# Patient Record
Sex: MALE | Race: WHITE | Employment: FULL TIME | URBAN - METROPOLITAN AREA
[De-identification: names, ages, dates, MRNs, and addresses within clinical notes are randomized per-mention and may not be internally consistent; named-entity substitution may affect disease eponyms.]

---

## 2019-01-03 ENCOUNTER — HOSPITAL ENCOUNTER (EMERGENCY)
Age: 34
Discharge: PSYCHIATRIC OR SUBSTANCE USE FACILITY | End: 2019-01-03
Attending: EMERGENCY MEDICINE
Payer: OTHER GOVERNMENT

## 2019-01-03 VITALS
HEART RATE: 71 BPM | SYSTOLIC BLOOD PRESSURE: 122 MMHG | TEMPERATURE: 98.4 F | DIASTOLIC BLOOD PRESSURE: 78 MMHG | RESPIRATION RATE: 18 BRPM | OXYGEN SATURATION: 98 %

## 2019-01-03 DIAGNOSIS — R45.851 SUICIDAL IDEATION: Primary | ICD-10-CM

## 2019-01-03 LAB
ACETAMINOPHEN LEVEL: <5 MCG/ML (ref 10–30)
ALBUMIN SERPL-MCNC: 5 G/DL (ref 3.5–5.2)
ALP BLD-CCNC: 93 U/L (ref 40–129)
ALT SERPL-CCNC: 42 U/L (ref 0–40)
AMPHETAMINE SCREEN, URINE: NOT DETECTED
ANION GAP SERPL CALCULATED.3IONS-SCNC: 13 MMOL/L (ref 7–16)
AST SERPL-CCNC: 24 U/L (ref 0–39)
BARBITURATE SCREEN URINE: NOT DETECTED
BASOPHILS ABSOLUTE: 0.03 E9/L (ref 0–0.2)
BASOPHILS RELATIVE PERCENT: 0.6 % (ref 0–2)
BENZODIAZEPINE SCREEN, URINE: NOT DETECTED
BILIRUB SERPL-MCNC: 0.5 MG/DL (ref 0–1.2)
BUN BLDV-MCNC: 12 MG/DL (ref 6–20)
CALCIUM SERPL-MCNC: 9.6 MG/DL (ref 8.6–10.2)
CANNABINOID SCREEN URINE: NOT DETECTED
CHLORIDE BLD-SCNC: 99 MMOL/L (ref 98–107)
CO2: 30 MMOL/L (ref 22–29)
COCAINE METABOLITE SCREEN URINE: NOT DETECTED
CREAT SERPL-MCNC: 0.9 MG/DL (ref 0.7–1.2)
EKG ATRIAL RATE: 63 BPM
EKG P AXIS: 22 DEGREES
EKG P-R INTERVAL: 124 MS
EKG Q-T INTERVAL: 418 MS
EKG QRS DURATION: 96 MS
EKG QTC CALCULATION (BAZETT): 427 MS
EKG R AXIS: -2 DEGREES
EKG T AXIS: 42 DEGREES
EKG VENTRICULAR RATE: 63 BPM
EOSINOPHILS ABSOLUTE: 0.05 E9/L (ref 0.05–0.5)
EOSINOPHILS RELATIVE PERCENT: 1.1 % (ref 0–6)
ETHANOL: <10 MG/DL (ref 0–0.08)
GFR AFRICAN AMERICAN: >60
GFR NON-AFRICAN AMERICAN: >60 ML/MIN/1.73
GLUCOSE BLD-MCNC: 105 MG/DL (ref 74–99)
HCT VFR BLD CALC: 45.8 % (ref 37–54)
HEMOGLOBIN: 15.2 G/DL (ref 12.5–16.5)
IMMATURE GRANULOCYTES #: 0.01 E9/L
IMMATURE GRANULOCYTES %: 0.2 % (ref 0–5)
LYMPHOCYTES ABSOLUTE: 1.52 E9/L (ref 1.5–4)
LYMPHOCYTES RELATIVE PERCENT: 32.3 % (ref 20–42)
MCH RBC QN AUTO: 29.2 PG (ref 26–35)
MCHC RBC AUTO-ENTMCNC: 33.2 % (ref 32–34.5)
MCV RBC AUTO: 88.1 FL (ref 80–99.9)
METHADONE SCREEN, URINE: NOT DETECTED
MONOCYTES ABSOLUTE: 0.38 E9/L (ref 0.1–0.95)
MONOCYTES RELATIVE PERCENT: 8.1 % (ref 2–12)
NEUTROPHILS ABSOLUTE: 2.72 E9/L (ref 1.8–7.3)
NEUTROPHILS RELATIVE PERCENT: 57.7 % (ref 43–80)
OPIATE SCREEN URINE: NOT DETECTED
PDW BLD-RTO: 12.2 FL (ref 11.5–15)
PHENCYCLIDINE SCREEN URINE: NOT DETECTED
PLATELET # BLD: 209 E9/L (ref 130–450)
PMV BLD AUTO: 9.1 FL (ref 7–12)
POTASSIUM SERPL-SCNC: 3.7 MMOL/L (ref 3.5–5)
PROPOXYPHENE SCREEN: NOT DETECTED
RBC # BLD: 5.2 E12/L (ref 3.8–5.8)
SALICYLATE, SERUM: <0.3 MG/DL (ref 0–30)
SODIUM BLD-SCNC: 142 MMOL/L (ref 132–146)
TOTAL PROTEIN: 7.7 G/DL (ref 6.4–8.3)
TRICYCLIC ANTIDEPRESSANTS SCREEN SERUM: NEGATIVE NG/ML
WBC # BLD: 4.7 E9/L (ref 4.5–11.5)

## 2019-01-03 PROCEDURE — 93005 ELECTROCARDIOGRAM TRACING: CPT | Performed by: PHYSICIAN ASSISTANT

## 2019-01-03 PROCEDURE — 80053 COMPREHEN METABOLIC PANEL: CPT

## 2019-01-03 PROCEDURE — 85025 COMPLETE CBC W/AUTO DIFF WBC: CPT

## 2019-01-03 PROCEDURE — 36415 COLL VENOUS BLD VENIPUNCTURE: CPT

## 2019-01-03 PROCEDURE — 99285 EMERGENCY DEPT VISIT HI MDM: CPT

## 2019-01-03 PROCEDURE — G0480 DRUG TEST DEF 1-7 CLASSES: HCPCS

## 2019-01-03 PROCEDURE — 80307 DRUG TEST PRSMV CHEM ANLYZR: CPT

## 2019-01-04 PROCEDURE — 93010 ELECTROCARDIOGRAM REPORT: CPT | Performed by: INTERNAL MEDICINE

## 2019-06-21 ENCOUNTER — APPOINTMENT (OUTPATIENT)
Dept: GENERAL RADIOLOGY | Age: 34
End: 2019-06-21
Payer: OTHER GOVERNMENT

## 2019-06-21 ENCOUNTER — APPOINTMENT (OUTPATIENT)
Dept: CT IMAGING | Age: 34
End: 2019-06-21
Payer: OTHER GOVERNMENT

## 2019-06-21 ENCOUNTER — HOSPITAL ENCOUNTER (OUTPATIENT)
Age: 34
Setting detail: OBSERVATION
Discharge: HOME | End: 2019-06-22
Attending: EMERGENCY MEDICINE | Admitting: HOSPITALIST
Payer: OTHER GOVERNMENT

## 2019-06-21 DIAGNOSIS — S22.009A CLOSED FRACTURE OF THORACIC VERTEBRA, UNSPECIFIED FRACTURE MORPHOLOGY, UNSPECIFIED THORACIC VERTEBRAL LEVEL, INITIAL ENCOUNTER (HCC): ICD-10-CM

## 2019-06-21 DIAGNOSIS — G40.919 BREAKTHROUGH SEIZURE (HCC): Primary | ICD-10-CM

## 2019-06-21 PROBLEM — R56.9 SEIZURES (HCC): Status: ACTIVE | Noted: 2019-06-21

## 2019-06-21 PROBLEM — S06.9XAA TBI (TRAUMATIC BRAIN INJURY): Status: ACTIVE | Noted: 2019-06-21

## 2019-06-21 PROBLEM — S22.000A CLOSED COMPRESSION FRACTURE OF THORACIC VERTEBRA (HCC): Status: ACTIVE | Noted: 2019-06-21

## 2019-06-21 PROBLEM — Z91.14 NONCOMPLIANCE WITH MEDICATIONS: Status: ACTIVE | Noted: 2019-06-21

## 2019-06-21 LAB
ALBUMIN SERPL-MCNC: 4.7 G/DL (ref 3.5–5.2)
ALP BLD-CCNC: 66 U/L (ref 40–129)
ALT SERPL-CCNC: 36 U/L (ref 0–40)
ANION GAP SERPL CALCULATED.3IONS-SCNC: 14 MMOL/L (ref 7–16)
AST SERPL-CCNC: 36 U/L (ref 0–39)
BILIRUB SERPL-MCNC: 0.6 MG/DL (ref 0–1.2)
BUN BLDV-MCNC: 8 MG/DL (ref 6–20)
CALCIUM SERPL-MCNC: 9.3 MG/DL (ref 8.6–10.2)
CHLORIDE BLD-SCNC: 103 MMOL/L (ref 98–107)
CO2: 24 MMOL/L (ref 22–29)
CREAT SERPL-MCNC: 0.8 MG/DL (ref 0.7–1.2)
GFR AFRICAN AMERICAN: >60
GFR AFRICAN AMERICAN: >60
GFR NON-AFRICAN AMERICAN: >60 ML/MIN/1.73
GFR NON-AFRICAN AMERICAN: >60 ML/MIN/1.73
GLUCOSE BLD-MCNC: 101 MG/DL (ref 74–99)
GLUCOSE BLD-MCNC: 91 MG/DL (ref 74–99)
HCT VFR BLD CALC: 43.5 % (ref 37–54)
HEMOGLOBIN: 15.1 G/DL (ref 12.5–16.5)
MAGNESIUM: 2.2 MG/DL (ref 1.6–2.6)
MCH RBC QN AUTO: 30.6 PG (ref 26–35)
MCHC RBC AUTO-ENTMCNC: 34.7 % (ref 32–34.5)
MCV RBC AUTO: 88.2 FL (ref 80–99.9)
PDW BLD-RTO: 12 FL (ref 11.5–15)
PERFORMED ON: ABNORMAL
PHOSPHORUS: 3.4 MG/DL (ref 2.5–4.5)
PLATELET # BLD: 228 E9/L (ref 130–450)
PMV BLD AUTO: 9.6 FL (ref 7–12)
POC CHLORIDE: 105 MMOL/L (ref 100–108)
POC CREATININE: 0.8 MG/DL (ref 0.7–1.2)
POC POTASSIUM: 3.9 MMOL/L (ref 3.5–5)
POC SODIUM: 141 MMOL/L (ref 132–146)
POTASSIUM SERPL-SCNC: 4.1 MMOL/L (ref 3.5–5)
RBC # BLD: 4.93 E12/L (ref 3.8–5.8)
SODIUM BLD-SCNC: 141 MMOL/L (ref 132–146)
TOTAL PROTEIN: 7.1 G/DL (ref 6.4–8.3)
VALPROIC ACID LEVEL: <3 MCG/ML (ref 50–100)
WBC # BLD: 11.7 E9/L (ref 4.5–11.5)

## 2019-06-21 PROCEDURE — 93005 ELECTROCARDIOGRAM TRACING: CPT | Performed by: EMERGENCY MEDICINE

## 2019-06-21 PROCEDURE — 36415 COLL VENOUS BLD VENIPUNCTURE: CPT

## 2019-06-21 PROCEDURE — 6370000000 HC RX 637 (ALT 250 FOR IP): Performed by: CLINICAL NURSE SPECIALIST

## 2019-06-21 PROCEDURE — 99219 PR INITIAL OBSERVATION CARE/DAY 50 MINUTES: CPT | Performed by: NEUROLOGICAL SURGERY

## 2019-06-21 PROCEDURE — 72128 CT CHEST SPINE W/O DYE: CPT

## 2019-06-21 PROCEDURE — 6370000000 HC RX 637 (ALT 250 FOR IP): Performed by: PHYSICIAN ASSISTANT

## 2019-06-21 PROCEDURE — 85027 COMPLETE CBC AUTOMATED: CPT

## 2019-06-21 PROCEDURE — 96374 THER/PROPH/DIAG INJ IV PUSH: CPT

## 2019-06-21 PROCEDURE — G0378 HOSPITAL OBSERVATION PER HR: HCPCS

## 2019-06-21 PROCEDURE — 72072 X-RAY EXAM THORAC SPINE 3VWS: CPT

## 2019-06-21 PROCEDURE — 72131 CT LUMBAR SPINE W/O DYE: CPT

## 2019-06-21 PROCEDURE — 82565 ASSAY OF CREATININE: CPT

## 2019-06-21 PROCEDURE — 82435 ASSAY OF BLOOD CHLORIDE: CPT

## 2019-06-21 PROCEDURE — 80053 COMPREHEN METABOLIC PANEL: CPT

## 2019-06-21 PROCEDURE — 72100 X-RAY EXAM L-S SPINE 2/3 VWS: CPT

## 2019-06-21 PROCEDURE — 6360000002 HC RX W HCPCS: Performed by: PHYSICIAN ASSISTANT

## 2019-06-21 PROCEDURE — 2580000003 HC RX 258: Performed by: CLINICAL NURSE SPECIALIST

## 2019-06-21 PROCEDURE — 83735 ASSAY OF MAGNESIUM: CPT

## 2019-06-21 PROCEDURE — 84100 ASSAY OF PHOSPHORUS: CPT

## 2019-06-21 PROCEDURE — 84132 ASSAY OF SERUM POTASSIUM: CPT

## 2019-06-21 PROCEDURE — 99284 EMERGENCY DEPT VISIT MOD MDM: CPT

## 2019-06-21 PROCEDURE — 80164 ASSAY DIPROPYLACETIC ACD TOT: CPT

## 2019-06-21 PROCEDURE — 84295 ASSAY OF SERUM SODIUM: CPT

## 2019-06-21 PROCEDURE — 82947 ASSAY GLUCOSE BLOOD QUANT: CPT

## 2019-06-21 RX ORDER — SODIUM CHLORIDE 0.9 % (FLUSH) 0.9 %
10 SYRINGE (ML) INJECTION EVERY 12 HOURS SCHEDULED
Status: DISCONTINUED | OUTPATIENT
Start: 2019-06-21 | End: 2019-06-22 | Stop reason: HOSPADM

## 2019-06-21 RX ORDER — M-VIT,TX,IRON,MINS/CALC/FOLIC 27MG-0.4MG
1 TABLET ORAL DAILY
COMMUNITY

## 2019-06-21 RX ORDER — LAMOTRIGINE 150 MG/1
150 TABLET ORAL 2 TIMES DAILY
COMMUNITY

## 2019-06-21 RX ORDER — HYDROCODONE BITARTRATE AND ACETAMINOPHEN 5; 325 MG/1; MG/1
1 TABLET ORAL ONCE
Status: COMPLETED | OUTPATIENT
Start: 2019-06-21 | End: 2019-06-21

## 2019-06-21 RX ORDER — ACETAMINOPHEN 500 MG
1000 TABLET ORAL ONCE
Status: COMPLETED | OUTPATIENT
Start: 2019-06-21 | End: 2019-06-21

## 2019-06-21 RX ORDER — HYDROCODONE BITARTRATE AND ACETAMINOPHEN 5; 325 MG/1; MG/1
1 TABLET ORAL EVERY 4 HOURS PRN
Status: DISCONTINUED | OUTPATIENT
Start: 2019-06-21 | End: 2019-06-22 | Stop reason: HOSPADM

## 2019-06-21 RX ORDER — LANOLIN ALCOHOL/MO/W.PET/CERES
3 CREAM (GRAM) TOPICAL NIGHTLY
COMMUNITY

## 2019-06-21 RX ORDER — DIVALPROEX SODIUM 250 MG/1
500 TABLET, DELAYED RELEASE ORAL 2 TIMES DAILY
COMMUNITY

## 2019-06-21 RX ORDER — CHLORAL HYDRATE 500 MG
1000 CAPSULE ORAL 2 TIMES DAILY
COMMUNITY

## 2019-06-21 RX ORDER — ONDANSETRON 2 MG/ML
4 INJECTION INTRAMUSCULAR; INTRAVENOUS EVERY 6 HOURS PRN
Status: DISCONTINUED | OUTPATIENT
Start: 2019-06-21 | End: 2019-06-22 | Stop reason: HOSPADM

## 2019-06-21 RX ORDER — DIVALPROEX SODIUM 250 MG/1
250 TABLET, DELAYED RELEASE ORAL 2 TIMES DAILY
Status: DISCONTINUED | OUTPATIENT
Start: 2019-06-21 | End: 2019-06-22 | Stop reason: HOSPADM

## 2019-06-21 RX ORDER — SODIUM CHLORIDE 0.9 % (FLUSH) 0.9 %
10 SYRINGE (ML) INJECTION PRN
Status: DISCONTINUED | OUTPATIENT
Start: 2019-06-21 | End: 2019-06-22 | Stop reason: HOSPADM

## 2019-06-21 RX ORDER — HYDROCODONE BITARTRATE AND ACETAMINOPHEN 5; 325 MG/1; MG/1
2 TABLET ORAL EVERY 4 HOURS PRN
Status: DISCONTINUED | OUTPATIENT
Start: 2019-06-21 | End: 2019-06-22 | Stop reason: HOSPADM

## 2019-06-21 RX ORDER — LEVETIRACETAM 10 MG/ML
1000 INJECTION INTRAVASCULAR ONCE
Status: COMPLETED | OUTPATIENT
Start: 2019-06-21 | End: 2019-06-21

## 2019-06-21 RX ORDER — PRAZOSIN HYDROCHLORIDE 1 MG/1
1 CAPSULE ORAL DAILY
COMMUNITY

## 2019-06-21 RX ADMIN — LEVETIRACETAM 1000 MG: 10 INJECTION INTRAVENOUS at 12:13

## 2019-06-21 RX ADMIN — HYDROCODONE BITARTRATE AND ACETAMINOPHEN 2 TABLET: 5; 325 TABLET ORAL at 21:29

## 2019-06-21 RX ADMIN — ACETAMINOPHEN 1000 MG: 500 TABLET ORAL at 13:26

## 2019-06-21 RX ADMIN — HYDROCODONE BITARTRATE AND ACETAMINOPHEN 1 TABLET: 5; 325 TABLET ORAL at 15:09

## 2019-06-21 RX ADMIN — DIVALPROEX SODIUM 250 MG: 250 TABLET, DELAYED RELEASE ORAL at 22:43

## 2019-06-21 RX ADMIN — Medication 10 ML: at 21:24

## 2019-06-21 ASSESSMENT — PAIN DESCRIPTION - ORIENTATION
ORIENTATION: MID;LOWER
ORIENTATION: MID;LOWER
ORIENTATION: LOWER
ORIENTATION: MID;LOWER

## 2019-06-21 ASSESSMENT — PAIN SCALES - GENERAL
PAINLEVEL_OUTOF10: 3
PAINLEVEL_OUTOF10: 8
PAINLEVEL_OUTOF10: 8
PAINLEVEL_OUTOF10: 7
PAINLEVEL_OUTOF10: 7
PAINLEVEL_OUTOF10: 8

## 2019-06-21 ASSESSMENT — PAIN DESCRIPTION - PAIN TYPE
TYPE: ACUTE PAIN

## 2019-06-21 ASSESSMENT — PAIN DESCRIPTION - DESCRIPTORS
DESCRIPTORS: ACHING;CONSTANT;DISCOMFORT
DESCRIPTORS: CONSTANT;ACHING
DESCRIPTORS: CONSTANT;ACHING;JABBING

## 2019-06-21 ASSESSMENT — PAIN DESCRIPTION - ONSET
ONSET: ON-GOING

## 2019-06-21 ASSESSMENT — PAIN DESCRIPTION - PROGRESSION
CLINICAL_PROGRESSION: GRADUALLY WORSENING
CLINICAL_PROGRESSION: NOT CHANGED

## 2019-06-21 ASSESSMENT — PAIN DESCRIPTION - LOCATION
LOCATION: BACK
LOCATION: BACK;CHEST

## 2019-06-21 ASSESSMENT — PAIN DESCRIPTION - FREQUENCY
FREQUENCY: CONTINUOUS

## 2019-06-21 NOTE — ED NOTES
Bed: 36  Expected date:   Expected time:   Means of arrival:   Comments:  ems     Florencio Gonzalez RN  06/21/19 1123

## 2019-06-21 NOTE — H&P
apparent distress, appears stated age and cooperative. HEENT:  Normal cephalic, atraumatic without obvious deformity. Pupils equal, round, and reactive to light. Extra ocular muscles intact. Conjunctivae/corneas clear. Neck: Supple, with full range of motion. No jugular venous distention. Trachea midline. Respiratory:  Normal respiratory effort. Clear to auscultation, bilaterally without Rales/Wheezes/Rhonchi. Cardiovascular:  Regular rate and rhythm with normal S1/S2 without murmurs, rubs or gallops. Abdomen: Soft, non-tender, non-distended with normal bowel sounds. Musculoskeletal:  No clubbing, cyanosis or edema bilaterally. Full range of motion without deformity. Skin: Skin color, texture, turgor normal.  No rashes or lesions. Neurologic:  Neurovascularly intact without any focal sensory/motor deficits. .  Psychiatric:  Alert and oriented, thought content appropriate, normal insight  Capillary Refill: Brisk,< 3 seconds   Peripheral Pulses: +2 palpable, equal bilaterally         Labs:     Recent Labs     06/21/19  1630   WBC 11.7*   HGB 15.1   HCT 43.5        Recent Labs     06/21/19  1207   CREATININE 0.8     No results for input(s): AST, ALT, BILIDIR, BILITOT, ALKPHOS in the last 72 hours. No results for input(s): INR in the last 72 hours. No results for input(s): Madalynn Bridgett in the last 72 hours. Urinalysis:    No results found for: NITRU, WBCUA, BACTERIA, RBCUA, BLOODU, SPECGRAV, GLUCOSEU    CT thoracic/lumbar spine  1. Mild depression seen involving superior endplates of T4, T6, T7, T8, T9, T10, and T11 concerning for recent fractures. MRI thoracic spine could be helpful for further evaluation.     2. No evidence of lumbar spine fracture    ASSESSMENT:    Active Hospital Problems    Diagnosis Date Noted    Closed compression fracture of thoracic vertebra ([facility] [S22.000A] 06/21/2019    Seizures ([address] [R56.9] 06/21/2019    TBI (traumatic brain injury) ([facility] [S06.9X9A] 06/21/2019

## 2019-06-22 VITALS
OXYGEN SATURATION: 97 % | HEART RATE: 58 BPM | RESPIRATION RATE: 16 BRPM | HEIGHT: 69 IN | DIASTOLIC BLOOD PRESSURE: 52 MMHG | WEIGHT: 182 LBS | BODY MASS INDEX: 26.96 KG/M2 | TEMPERATURE: 97.5 F | SYSTOLIC BLOOD PRESSURE: 80 MMHG

## 2019-06-22 LAB
EKG ATRIAL RATE: 89 BPM
EKG P AXIS: 31 DEGREES
EKG P-R INTERVAL: 136 MS
EKG Q-T INTERVAL: 366 MS
EKG QRS DURATION: 86 MS
EKG QTC CALCULATION (BAZETT): 445 MS
EKG R AXIS: 17 DEGREES
EKG T AXIS: 37 DEGREES
EKG VENTRICULAR RATE: 89 BPM

## 2019-06-22 PROCEDURE — L0464 TLSO 4MOD SACRO-SCAP PRE: HCPCS

## 2019-06-22 PROCEDURE — 6370000000 HC RX 637 (ALT 250 FOR IP): Performed by: FAMILY MEDICINE

## 2019-06-22 PROCEDURE — 93010 ELECTROCARDIOGRAM REPORT: CPT | Performed by: INTERNAL MEDICINE

## 2019-06-22 PROCEDURE — 6370000000 HC RX 637 (ALT 250 FOR IP): Performed by: CLINICAL NURSE SPECIALIST

## 2019-06-22 PROCEDURE — G0378 HOSPITAL OBSERVATION PER HR: HCPCS

## 2019-06-22 PROCEDURE — 99225 PR SBSQ OBSERVATION CARE/DAY 25 MINUTES: CPT | Performed by: NEUROLOGICAL SURGERY

## 2019-06-22 PROCEDURE — 2580000003 HC RX 258: Performed by: CLINICAL NURSE SPECIALIST

## 2019-06-22 RX ORDER — M-VIT,TX,IRON,MINS/CALC/FOLIC 27MG-0.4MG
1 TABLET ORAL DAILY
Status: DISCONTINUED | OUTPATIENT
Start: 2019-06-22 | End: 2019-06-22 | Stop reason: HOSPADM

## 2019-06-22 RX ORDER — PRAZOSIN HYDROCHLORIDE 1 MG/1
1 CAPSULE ORAL DAILY
Status: DISCONTINUED | OUTPATIENT
Start: 2019-06-22 | End: 2019-06-22 | Stop reason: HOSPADM

## 2019-06-22 RX ADMIN — DIVALPROEX SODIUM 250 MG: 250 TABLET, DELAYED RELEASE ORAL at 09:19

## 2019-06-22 RX ADMIN — Medication 10 ML: at 09:20

## 2019-06-22 RX ADMIN — MULTIPLE VITAMINS W/ MINERALS TAB 1 TABLET: TAB at 09:19

## 2019-06-22 RX ADMIN — LAMOTRIGINE 150 MG: 100 TABLET ORAL at 01:39

## 2019-06-22 RX ADMIN — HYDROCODONE BITARTRATE AND ACETAMINOPHEN 2 TABLET: 5; 325 TABLET ORAL at 01:39

## 2019-06-22 RX ADMIN — LAMOTRIGINE 150 MG: 100 TABLET ORAL at 09:19

## 2019-06-22 RX ADMIN — PRAZOSIN HYDROCHLORIDE 1 MG: 1 CAPSULE ORAL at 09:20

## 2019-06-22 ASSESSMENT — PAIN SCALES - GENERAL
PAINLEVEL_OUTOF10: 7
PAINLEVEL_OUTOF10: 4

## 2019-06-22 NOTE — DISCHARGE SUMMARY
Hospital Medicine Discharge Summary    Patient ID: Kelsey Cardoso      Patient's PCP: Omid Lopez, APRN - CNP    Admit Date: 6/21/2019     Discharge Date:   6-22-19    Admitting Physician: Stephy Davenport MD     Discharge Physician: Stephy Davenport MD     Discharge Diagnoses: Active Hospital Problems    Diagnosis    Closed compression fracture of thoracic vertebra ([facility] [S22.000A]    Seizures ([facility] [R56.9]    TBI (traumatic brain injury) ([facility] [S06.9X9A]    Noncompliance with medications [Z91.14]    Mult fractures of thoracic spine, closed, initial encounter [facility] [Z77.056F]       The patient was seen and examined on day of discharge and this discharge summary is in conjunction with any daily progress note from day of discharge. Hospital Course:     30 yo male hx seizure depression on meds, noncompliant with meds came to ER after having his usual seizure at work, c/o back pain and pleuetic back chest pain, CT of his spine revealed multiple fractures in his thoracic spine at T4, T6, T7, T8, T9, T10, and  T11.     RECURRENT SEIZURE  Noncomplianc  Thoracic spine fx due to seizure        Observation  TLSO brace  Pain controlled  Resumed AED   Compliance reinforced  D/w pt  Home today   F/u nsgy prn        Physical Exam Performed:     BP (!) 80/52   Pulse 58   Temp 97.5 °F (36.4 °C) (Temporal)   Resp 16   Ht 5' 9\" (1.753 m)   Wt 182 lb (82.6 kg)   SpO2 97%   BMI 26.88 kg/m²       General appearance:  No apparent distress, appears stated age and cooperative. HEENT:  Normal cephalic, atraumatic without obvious deformity. Pupils equal, round, and reactive to light. Extra ocular muscles intact. Conjunctivae/corneas clear. Neck: Supple, with full range of motion. No jugular venous distention. Trachea midline. Respiratory:  Normal respiratory effort. Clear to auscultation, bilaterally without Rales/Wheezes/Rhonchi.   Cardiovascular:  Regular rate and rhythm with normal S1/S2 without murmurs, rubs or gallops. Abdomen: Soft, non-tender, non-distended with normal bowel sounds. Musculoskeletal:  No clubbing, cyanosis or edema bilaterally. Full range of motion without deformity. Skin: Skin color, texture, turgor normal.  No rashes or lesions. Neurologic:  Neurovascularly intact without any focal sensory/motor deficits. .  Psychiatric:  Alert and oriented, thought content appropriate, normal insight            Labs: For convenience and continuity at follow-up the following most recent labs are provided:      CBC:    Lab Results   Component Value Date    WBC 11.7 06/21/2019    HGB 15.1 06/21/2019    HCT 43.5 06/21/2019     06/21/2019       Renal:    Lab Results   Component Value Date     06/21/2019    K 4.1 06/21/2019     06/21/2019    CO2 24 06/21/2019    BUN 8 06/21/2019    CREATININE 0.8 06/21/2019    CALCIUM 9.3 06/21/2019    PHOS 3.4 06/21/2019         Significant Diagnostic Studies    Radiology:   CT Thoracic Spine WO Contrast   Final Result      1. Mild depression seen involving superior endplates of T4, T6, T7,   T8, T9, T10, and T11 concerning for recent fractures. MRI thoracic   spine could be helpful for further evaluation. 2. No evidence of lumbar spine fracture. ALERT:  THIS IS AN ABNORMAL REPORT. CT Lumbar Spine WO Contrast   Final Result      1. Mild depression seen involving superior endplates of T4, T6, T7,   T8, T9, T10, and T11 concerning for recent fractures. MRI thoracic   spine could be helpful for further evaluation. 2. No evidence of lumbar spine fracture. ALERT:  THIS IS AN ABNORMAL REPORT. XR LUMBAR SPINE (2-3 VIEWS)   Final Result   Age-indeterminate T12 and L1 compression fractures. ALERT:  THIS IS AN ABNORMAL REPORT            XR THORACIC SPINE (3 VIEWS)   Final Result   Age-indeterminate T12 and L1 compression fractures.       ALERT:  THIS IS AN ABNORMAL REPORT                   Consults:     IP CONSULT TO INTERNAL MEDICINE  INPATIENT CONSULT TO ORTHOTIST/PROSTHETIST    Disposition:  home    Condition at Discharge: stable    Discharge Instructions/Follow-up: pcp/nsgy    Code Status:  Full Code     Activity: activity as tolerated    Diet: regular      Discharge Medications:     Current Discharge Medication List           Details   divalproex (DEPAKOTE) 250 MG DR tablet Take 250 mg by mouth 2 times daily      Omega-3 Fatty Acids (FISH OIL) 1000 MG CAPS Take 1,000 mg by mouth 2 times daily      lamoTRIgine (LAMICTAL) 150 MG tablet Take 150 mg by mouth 2 times daily      melatonin 3 MG TABS tablet Take 3 mg by mouth nightly      Multiple Vitamins-Minerals (THERAPEUTIC MULTIVITAMIN-MINERALS) tablet Take 1 tablet by mouth daily      prazosin (MINIPRESS) 1 MG capsule Take 1 mg by mouth daily             Time Spent on discharge is more than 35 min in the examination, evaluation, counseling and review of medications and discharge plan. Signed: Desmond Thomas MD   6/22/2019      Thank you CHACHA Maradiaga - MARÍA for the opportunity to be involved in this patient's care. If you have any questions or concerns please feel free to contact me at [phone].

## 2019-06-22 NOTE — CONSULTS
[address]                  [address] [id number],  [facility]                                  CONSULTATION    PATIENT NAME: Jonathan Vicente                      :        1985  MED REC NO:   [id number]                            ROOM:       5216  ACCOUNT NO:   [de-identified]                           ADMIT DATE: 2019  PROVIDER:     Aury Snow MD    CONSULT DATE:  2019    REASON FOR CONSULT:  T4, T6, T7, T8, T9, T10, and T11 thoracic  compression fractures. HISTORY OF PRESENT ILLNESS:  The patient is a 66-year-old gentleman with  a history of epilepsy who has been managed on Depakote and apparently  had not been taking his medications. He apparently had a seizure  earlier today and since the seizure, he has had excruciating back pain  that he described as sharp and stabbing pain. It is worse with  inspiration and movement and better with rest.  He denies any new  numbness, tingling, or weakness or loss of control of bowel or bladder  function. Pain is about 7/10. PAST MEDICAL HISTORY:  Positive of PTSD, anxiety, and depression. PAST SURGICAL HISTORY:  Negative. SOCIAL HISTORY:  Negative for tobacco or alcohol use. ALLERGIES:  He has no known drug allergies. HOME MEDICATIONS:  Include Depakote. FAMILY HISTORY:  Positive for epilepsy in his mother. REVIEW OF SYSTEMS:  HEENT:  Positive for headache, but negative for  double vision or blurry vision. CARDIOVASCULAR:  Positive for chest  pain with inspiration. RESPIRATORY:  Negative for shortness of breath,  asthma, bronchitis, or pneumonia. GASTROINTESTINAL:  Negative for  heartburn, nausea, vomiting, diarrhea, or constipation. GENITOURINARY:   Negative for hematuria or dysuria. HEMATOLOGIC:  Positive for easy  bruising. INFECTIOUS:  Negative for any recent infection. MUSCULOSKELETAL:  Positive for back pain. PSYCHIATRIC:  Positive for  anxiety and depression. NEUROLOGIC:  Positive for epilepsy. ENDOCRINE:  Negative for thyroid disorder or diabetes. PHYSICAL EXAMINATION:  VITAL SIGNS:  He is currently afebrile with a T-current of 37 degrees  Celsius, respiratory rate is 18, pulse 71, blood pressure is 113/72. GENERAL:  He is resting in bed, appears to be in mild to moderate  distress secondary to pain, appears his stated age. HEENT:  His head is normocephalic and atraumatic. Pupils are 3 to 2 mm  and reactive. He has no drainage out of his eyes, ears, nose, or  throat. SKIN:  His skin is warm and dry. MUSCULOSKELETAL:  He has got good range of motion of his bilateral upper  and lower extremities. ABDOMEN:  Soft, nontender, and nondistended. RESPIRATORY:  He is not using any accessory muscles of respirations. NEUROLOGIC:  On rest of his neurologic exam, he is awake, alert, and  oriented x3. Cranial nerves II through XII are intact bilaterally. Motor exam reveals 5/5 strength in his bilateral upper and lower  extremities. Sensation is grossly intact to light touch. Reflexes are  2+ and symmetric. Toes are going down. REVIEW OF IMAGING:  He had a CT scan of his thoracic spine that shows  that he has got compression fractures at T4, T6, T7, T8, T9, T10, and  T11.    ASSESSMENT AND PLAN:  The patient is a 66-year-old gentleman with  multiple thoracic fractures. He is neurologically stable. The plan is  to bring him in the hospital.  We will get him measured for a custom  TLSO brace and we will manage his fractures in a TLSO brace. We will  also work on pain control.         Doinisio Reza MD    D: 06/21/2019 16:24:40       T: 06/21/2019 18:27:34     [email or web address]  Job#: [id number]     Doc#: [id number]    CC:

## 2019-06-22 NOTE — PROGRESS NOTES
Department of Neurosurgery  Attending Progress Note    CHIEF COMPLAINT:    SUBJECTIVE:  Seen today for thoracic compression fracture.     ROS:    OBJECTIVE  Physical  VITALS:  BP (!) 80/52   Pulse 58   Temp 97.5 °F (36.4 °C) (Temporal)   Resp 16   Ht 5' 9\" (1.753 m)   Wt 182 lb (82.6 kg)   SpO2 97%   BMI 26.88 kg/m²   NEUROLOGIC:  Mental Status Exam:  Level of Alertness:   awake  Orientation:   person, place, time  Motor Exam:  Motor exam is symmetrical 5 out of 5 all extremities bilaterally  Sensory:  Sensory intact    Data  CBC:   Lab Results   Component Value Date    WBC 11.7 06/21/2019    RBC 4.93 06/21/2019    HGB 15.1 06/21/2019    HCT 43.5 06/21/2019    MCV 88.2 06/21/2019    MCH 30.6 06/21/2019    MCHC 34.7 06/21/2019    RDW 12.0 06/21/2019     06/21/2019    MPV 9.6 06/21/2019     BMP:    Lab Results   Component Value Date     06/21/2019    K 4.1 06/21/2019     06/21/2019    CO2 24 06/21/2019    BUN 8 06/21/2019    LABALBU 4.7 06/21/2019    CREATININE 0.8 06/21/2019    CALCIUM 9.3 06/21/2019    GFRAA >60 06/21/2019    LABGLOM >60 06/21/2019    GLUCOSE 91 06/21/2019     Current Inpatient Medications  Current Facility-Administered Medications: lamoTRIgine (LAMICTAL) tablet 150 mg, 150 mg, Oral, BID  therapeutic multivitamin-minerals 1 tablet, 1 tablet, Oral, Daily  prazosin (MINIPRESS) capsule 1 mg, 1 mg, Oral, Daily  divalproex (DEPAKOTE) DR tablet 250 mg, 250 mg, Oral, BID  sodium chloride flush 0.9 % injection 10 mL, 10 mL, Intravenous, 2 times per day  sodium chloride flush 0.9 % injection 10 mL, 10 mL, Intravenous, PRN  magnesium hydroxide (MILK OF MAGNESIA) 400 MG/5ML suspension 30 mL, 30 mL, Oral, Daily PRN  ondansetron (ZOFRAN) injection 4 mg, 4 mg, Intravenous, Q6H PRN  HYDROcodone-acetaminophen (NORCO) 5-325 MG per tablet 1 tablet, 1 tablet, Oral, Q4H PRN **OR** HYDROcodone-acetaminophen (NORCO) 5-325 MG per tablet 2 tablet, 2 tablet, Oral, Q4H PRN    ASSESSMENT AND PLAN:    Patient with thoracic compression fracture.   Stable Await TLSO brace    Erika Comp

## 2019-07-09 DIAGNOSIS — S22.009D: Primary | ICD-10-CM

## 2019-07-31 ENCOUNTER — OFFICE VISIT (OUTPATIENT)
Dept: NEUROSURGERY | Age: 34
End: 2019-07-31
Payer: OTHER GOVERNMENT

## 2019-07-31 ENCOUNTER — HOSPITAL ENCOUNTER (OUTPATIENT)
Age: 34
Discharge: HOME | End: 2019-08-02
Payer: OTHER GOVERNMENT

## 2019-07-31 ENCOUNTER — HOSPITAL ENCOUNTER (OUTPATIENT)
Dept: GENERAL RADIOLOGY | Age: 34
Discharge: HOME | End: 2019-08-02
Payer: OTHER GOVERNMENT

## 2019-07-31 VITALS
WEIGHT: 191 LBS | HEART RATE: 60 BPM | HEIGHT: 69 IN | DIASTOLIC BLOOD PRESSURE: 77 MMHG | BODY MASS INDEX: 28.29 KG/M2 | SYSTOLIC BLOOD PRESSURE: 118 MMHG

## 2019-07-31 DIAGNOSIS — S22.009D: ICD-10-CM

## 2019-07-31 DIAGNOSIS — S22.060D CLOSED WEDGE COMPRESSION FRACTURE OF EIGHTH THORACIC VERTEBRA WITH ROUTINE HEALING, SUBSEQUENT ENCOUNTER: Primary | ICD-10-CM

## 2019-07-31 PROCEDURE — 72070 X-RAY EXAM THORAC SPINE 2VWS: CPT

## 2019-07-31 PROCEDURE — 99212 OFFICE O/P EST SF 10 MIN: CPT | Performed by: PHYSICIAN ASSISTANT

## 2019-10-01 ENCOUNTER — OFFICE VISIT (OUTPATIENT)
Dept: NEUROSURGERY | Age: 34
End: 2019-10-01
Payer: OTHER GOVERNMENT

## 2019-10-01 ENCOUNTER — TELEPHONE (OUTPATIENT)
Dept: NEUROSURGERY | Age: 34
End: 2019-10-01

## 2019-10-01 ENCOUNTER — HOSPITAL ENCOUNTER (OUTPATIENT)
Age: 34
Discharge: HOME | End: 2019-10-03
Payer: OTHER GOVERNMENT

## 2019-10-01 ENCOUNTER — HOSPITAL ENCOUNTER (OUTPATIENT)
Dept: GENERAL RADIOLOGY | Age: 34
Discharge: HOME | End: 2019-10-03
Payer: OTHER GOVERNMENT

## 2019-10-01 VITALS
WEIGHT: 197 LBS | SYSTOLIC BLOOD PRESSURE: 121 MMHG | DIASTOLIC BLOOD PRESSURE: 78 MMHG | HEART RATE: 62 BPM | HEIGHT: 69 IN | BODY MASS INDEX: 29.18 KG/M2

## 2019-10-01 DIAGNOSIS — S22.000A COMPRESSION FRACTURE OF BODY OF THORACIC VERTEBRA (HCC): ICD-10-CM

## 2019-10-01 DIAGNOSIS — S22.000A COMPRESSION FRACTURE OF BODY OF THORACIC VERTEBRA (HCC): Primary | ICD-10-CM

## 2019-10-01 PROCEDURE — 99212 OFFICE O/P EST SF 10 MIN: CPT | Performed by: PHYSICIAN ASSISTANT

## 2019-10-01 PROCEDURE — 72070 X-RAY EXAM THORAC SPINE 2VWS: CPT

## 2021-07-22 NOTE — ED PROVIDER NOTES
ED Attending  CC: Y[place]     Department of Emergency Medicine   ED  Provider Note  Admit Date/RoomTime: 6/21/2019 11:21 AM  ED Room: 36/36  Chief Complaint   Seizures (seizure at class at [address])    History of Present Illness   Source of history provided by:  patient. History/Exam Limitations: none. Montana Molina is a 29 y.o. old male who has a past medical Hx of:   Past Medical History:   Diagnosis Date    Seizures [facility])    presents to the emergency department by ambulance where the patient received see Ambulance Run Sheet prior to arrival., for seizure(s), which occurred just prior to arrival.  Patient does not recall the seizure. EMS reports that several witnesses saw him have a seizure, they were unable to describe the seizure. Per report states that he did not hit his head. Patient states that he woke up, in a chair, to EMS around him. He states that he has a history of epilepsy, states that he takes valproic acid for his seizures. He states that he did not take his dose this morning and he cannot recall if he took it last night. He believes that he has been compliant other than these 2 doses. He states that he had one prior seizure, several years ago. States that he follows with the [place] for all of his medications and medical problems. Patient denies any drug or alcohol use. He denies any headaches or visual changes. No nausea or vomiting. He did bite his tongue. He denies any loss of control of his bowels or bladder. Patient does complain of some mid back pain. It is worse with movement. Nothing makes it feel better. ROS    Pertinent positives and negatives are stated within HPI, all other systems reviewed and are negative. No past surgical history on file. Social History:  reports that he has never smoked. He has never used smokeless tobacco.  Family History: family history is not on file. Allergies: Patient has no known allergies.     Physical Exam           ED Triage Vitals [06/21/19 Spoke with patient she is feeling well post cath. Groin site clean dry and intact.     Cardiac cath 7/21/2021  Result Text  Right coronary artery:  Dominant mild  Left main:  Normal  Left anterior descending :  Severe InStent restenoses in proximal and mid segment  Left circumflex:  Nondominant mild  Bypass graft LIMA to LAD patent  Bypass graft aorta to diagonal patent  Left ventriculography:  Normal LVEF and wall motion  Increased left ventricle end-diastolic pressure  Recommendations:  Medical management  Risk factor modification  Anurag Foreman MD FACC    Needs 2-3 week follow up - can be NP per Dr Foreman  Patient scheduled with Jessy Suarez NP 8/9/2021 at 2:30pm at [facility].    1122]   BP Temp Temp src Pulse Resp SpO2 Height Weight   117/82 96 °F (35.6 °C) -- 102 20 97 % -- --      Oxygen Saturation Interpretation: Normal.    Constitutional:  Alert, development consistent with age. HEENT:  NC/NT. Airway patent. Abrasion to the right side of the lateral tongue. No laceration or active bleeding at this time. Neck:  Normal ROM. Supple. Denies any midline tenderness to palpation. Respiratory:  Clear to auscultation and breath sounds equal.  CV:  Regular rate and rhythm, normal heart sounds, without pathological murmurs, ectopy, gallops, or rubs. GI:  Abdomen Soft, nontender, good bowel sounds. No firm or pulsatile mass. Back:  No costovertebral tenderness. Patient denies any midline tenderness of the thoracic or lumbar spine. 2+ dorsalis pedis pulses peripherally. Extremities: No tenderness or edema noted. Integument:  Normal turgor. Warm, dry, without visible rash, unless noted elsewhere. Neurological:  Oriented x 3. GCS 15. Motor functions intact. Lab / Imaging Results   (All laboratory and radiology results have been personally reviewed by myself)  Labs:  Results for orders placed or performed during the hospital encounter of 06/21/19   Valproic acid level, total   Result Value Ref Range    Valproic Acid Lvl <3 (L) 50 - 100 mcg/mL   POCT Venous   Result Value Ref Range    POC Sodium 141 132 - 146 mmol/L    POC Potassium 3.9 3.5 - 5.0 mmol/L    POC Chloride 105 100 - 108 mmol/L    POC Glucose 101 (H) 74 - 99 mg/dl    POC Creatinine 0.8 0.7 - 1.2 mg/dL    GFR Non-African American >60 >=60 mL/min/1.73    GFR  >60     Performed on SEE BELOW      Imaging: All Radiology results interpreted by Radiologist unless otherwise noted. CT Thoracic Spine WO Contrast   Final Result      1. Mild depression seen involving superior endplates of T4, T6, T7,   T8, T9, T10, and T11 concerning for recent fractures.  MRI thoracic   spine could be helpful for further evaluation. 2. No evidence of lumbar spine fracture. ALERT:  THIS IS AN ABNORMAL REPORT. CT Lumbar Spine WO Contrast   Final Result      1. Mild depression seen involving superior endplates of T4, T6, T7,   T8, T9, T10, and T11 concerning for recent fractures. MRI thoracic   spine could be helpful for further evaluation. 2. No evidence of lumbar spine fracture. ALERT:  THIS IS AN ABNORMAL REPORT. XR LUMBAR SPINE (2-3 VIEWS)   Final Result   Age-indeterminate T12 and L1 compression fractures. ALERT:  THIS IS AN ABNORMAL REPORT            XR THORACIC SPINE (3 VIEWS)   Final Result   Age-indeterminate T12 and L1 compression fractures. ALERT:  THIS IS AN ABNORMAL REPORT                ED Course / Medical Decision Making     Medications   levetiracetam (KEPPRA) 1000 mg/100 mL IVPB (0 mg Intravenous Stopped 6/21/19 1228)   acetaminophen (TYLENOL) tablet 1,000 mg (1,000 mg Oral Given 6/21/19 1326)   HYDROcodone-acetaminophen (NORCO) 5-325 MG per tablet 1 tablet (1 tablet Oral Given 6/21/19 1509)        Re-Evaluations:  6/21/19      Time: 1320    Patients symptoms show no change. Updated on results. Time: 1600   Patient updated on results. Agreeable to admission at this time. He continues to deny any midline pain to palpation over the cervical, thoracic or lumbar spine. Consultations:             Billy Calderon ORTHOTIST/PROSTHETIST      Dr. Roxanna Ibarra: Discussed case. He does request CT scans for further evaluation. Updated on results, he does request admission to medicine for further evaluation and management. Dr. Deepthi Hodge: spoke w/[facility], agreeable to admission at this time. Procedures:   none    MDM: Patient has multiple thoracic vertebrae fractures. After speaking with neurosurgery, he recommends admission at this time. Patient is agreeable to admission at this time.     Counseling:   I have spoken with the patient and discussed todays results, in addition to providing specific details for the plan of care and counseling regarding the diagnosis and prognosis and are agreeable with the plan. Assessment      1. Breakthrough seizure ([facility]    2. Closed fracture of thoracic vertebra, unspecified fracture morphology, unspecified thoracic vertebral level, initial encounter ([facility]         Plan   Admit to general medicine. Condition is good. New Medications     New Prescriptions    No medications on file     Electronically signed by MIRELA Rosen   DD: 6/21/19  **This report was transcribed using voice recognition software. Every effort was made to ensure accuracy; however, inadvertent computerized transcription errors may be present.   END OF PROVIDER NOTE            Fer Rosen  06/21/19 7627